# Patient Record
Sex: FEMALE | Race: WHITE | NOT HISPANIC OR LATINO | URBAN - METROPOLITAN AREA
[De-identification: names, ages, dates, MRNs, and addresses within clinical notes are randomized per-mention and may not be internally consistent; named-entity substitution may affect disease eponyms.]

---

## 2017-03-17 ENCOUNTER — OFFICE (OUTPATIENT)
Dept: URBAN - METROPOLITAN AREA CLINIC 101 | Facility: CLINIC | Age: 57
End: 2017-03-17
Payer: COMMERCIAL

## 2017-03-17 VITALS
TEMPERATURE: 97.9 F | DIASTOLIC BLOOD PRESSURE: 92 MMHG | HEART RATE: 81 BPM | SYSTOLIC BLOOD PRESSURE: 134 MMHG | WEIGHT: 179 LBS | HEIGHT: 63 IN

## 2017-03-17 DIAGNOSIS — R49.8 OTHER VOICE AND RESONANCE DISORDERS: ICD-10-CM

## 2017-03-17 DIAGNOSIS — D50.9 IRON DEFICIENCY ANEMIA, UNSPECIFIED: ICD-10-CM

## 2017-03-17 DIAGNOSIS — K21.9 GASTRO-ESOPHAGEAL REFLUX DISEASE WITHOUT ESOPHAGITIS: ICD-10-CM

## 2017-03-17 DIAGNOSIS — R13.19 OTHER DYSPHAGIA: ICD-10-CM

## 2017-03-17 PROCEDURE — 99214 OFFICE O/P EST MOD 30 MIN: CPT

## 2017-05-18 ENCOUNTER — ON CAMPUS - OUTPATIENT (OUTPATIENT)
Dept: URBAN - METROPOLITAN AREA HOSPITAL 35 | Facility: HOSPITAL | Age: 57
End: 2017-05-18

## 2017-05-18 DIAGNOSIS — R13.10 DYSPHAGIA, UNSPECIFIED: ICD-10-CM

## 2017-05-18 PROCEDURE — 43239 EGD BIOPSY SINGLE/MULTIPLE: CPT

## 2017-05-18 PROCEDURE — 43249 ESOPH EGD DILATION <30 MM: CPT

## 2017-08-03 ENCOUNTER — OFFICE (OUTPATIENT)
Dept: URBAN - METROPOLITAN AREA CLINIC 101 | Facility: CLINIC | Age: 57
End: 2017-08-03
Payer: COMMERCIAL

## 2017-08-03 VITALS
HEART RATE: 84 BPM | TEMPERATURE: 97.9 F | SYSTOLIC BLOOD PRESSURE: 131 MMHG | DIASTOLIC BLOOD PRESSURE: 89 MMHG | HEIGHT: 63 IN | WEIGHT: 175 LBS

## 2017-08-03 DIAGNOSIS — M34.89 OTHER SYSTEMIC SCLEROSIS: ICD-10-CM

## 2017-08-03 DIAGNOSIS — D50.9 IRON DEFICIENCY ANEMIA, UNSPECIFIED: ICD-10-CM

## 2017-08-03 DIAGNOSIS — K22.2 ESOPHAGEAL OBSTRUCTION: ICD-10-CM

## 2017-08-03 PROCEDURE — 99214 OFFICE O/P EST MOD 30 MIN: CPT

## 2017-08-03 NOTE — SERVICEHPINOTES
PAULINO WINSLOW   is a   57   year old    female who is being seen in consultation at the request of   Anayeli Florian Dr.   for follow up to EGD. PmHx scleroderma. EGD 5/18/2017-esophageal stricture, s/p dilatation, esophageal biopsy-consistent with inflammation due to acid reflux. After her EGD her aciphex was increased to 20mg BID. No longer reports dysphagia. GERD is well controlled as long as she takes PPI BID and watches what she eats. Denies n/v, abdominal pain. She has a prior history of anemia.  Had a colonoscopy 4/8/2015- diverticulosis. Has history of iron deficiency anemia, will obtain recent labs we only have labs from 1/28/15 hgb-9.9, hct-30.2. 3/19 iron sat-7%. Capsule endoscopy 10/12/15-numerous AVM's in stomach and small intestine some were bleeding. She was referred to Dr. Silvano Padilla 11/15/15 but didn't need to have deep single balloon enteroscopy. She is going to have left knee replacement after Labor Day with Dr. Jessica.BR

## 2017-09-26 ENCOUNTER — INPATIENT HOSPITAL (OUTPATIENT)
Dept: URBAN - METROPOLITAN AREA HOSPITAL 32 | Facility: HOSPITAL | Age: 57
End: 2017-09-26
Payer: COMMERCIAL

## 2017-09-26 DIAGNOSIS — D72.829 ELEVATED WHITE BLOOD CELL COUNT, UNSPECIFIED: ICD-10-CM

## 2017-09-26 DIAGNOSIS — K29.60 OTHER GASTRITIS WITHOUT BLEEDING: ICD-10-CM

## 2017-09-26 DIAGNOSIS — D50.9 IRON DEFICIENCY ANEMIA, UNSPECIFIED: ICD-10-CM

## 2017-09-26 DIAGNOSIS — K20.8 OTHER ESOPHAGITIS: ICD-10-CM

## 2017-09-26 DIAGNOSIS — M34.1 CR(E)ST SYNDROME: ICD-10-CM

## 2017-09-26 DIAGNOSIS — R93.5 ABNORMAL FINDINGS ON DIAGNOSTIC IMAGING OF OTHER ABDOMINAL R: ICD-10-CM

## 2017-09-26 DIAGNOSIS — B37.81 CANDIDAL ESOPHAGITIS: ICD-10-CM

## 2017-09-26 DIAGNOSIS — K22.2 ESOPHAGEAL OBSTRUCTION: ICD-10-CM

## 2017-09-26 PROCEDURE — 99254 IP/OBS CNSLTJ NEW/EST MOD 60: CPT

## 2017-09-27 ENCOUNTER — INPATIENT HOSPITAL (OUTPATIENT)
Dept: URBAN - METROPOLITAN AREA HOSPITAL 32 | Facility: HOSPITAL | Age: 57
End: 2017-09-27
Payer: COMMERCIAL

## 2017-09-27 DIAGNOSIS — B37.81 CANDIDAL ESOPHAGITIS: ICD-10-CM

## 2017-09-27 DIAGNOSIS — M34.1 CR(E)ST SYNDROME: ICD-10-CM

## 2017-09-27 DIAGNOSIS — D50.9 IRON DEFICIENCY ANEMIA, UNSPECIFIED: ICD-10-CM

## 2017-09-27 DIAGNOSIS — R93.5 ABNORMAL FINDINGS ON DIAGNOSTIC IMAGING OF OTHER ABDOMINAL R: ICD-10-CM

## 2017-09-27 DIAGNOSIS — K22.2 ESOPHAGEAL OBSTRUCTION: ICD-10-CM

## 2017-09-27 DIAGNOSIS — K20.8 OTHER ESOPHAGITIS: ICD-10-CM

## 2017-09-27 DIAGNOSIS — K29.60 OTHER GASTRITIS WITHOUT BLEEDING: ICD-10-CM

## 2017-09-27 DIAGNOSIS — D72.829 ELEVATED WHITE BLOOD CELL COUNT, UNSPECIFIED: ICD-10-CM

## 2017-09-27 PROCEDURE — 99232 SBSQ HOSP IP/OBS MODERATE 35: CPT

## 2017-09-28 PROCEDURE — 43239 EGD BIOPSY SINGLE/MULTIPLE: CPT

## 2018-04-19 ENCOUNTER — OFFICE (OUTPATIENT)
Dept: URBAN - METROPOLITAN AREA CLINIC 101 | Facility: CLINIC | Age: 58
End: 2018-04-19

## 2018-04-19 VITALS
SYSTOLIC BLOOD PRESSURE: 140 MMHG | WEIGHT: 190 LBS | TEMPERATURE: 97.7 F | DIASTOLIC BLOOD PRESSURE: 82 MMHG | HEART RATE: 88 BPM | HEIGHT: 63 IN

## 2018-04-19 DIAGNOSIS — R10.11 RIGHT UPPER QUADRANT PAIN: ICD-10-CM

## 2018-04-19 DIAGNOSIS — M34.89 OTHER SYSTEMIC SCLEROSIS: ICD-10-CM

## 2018-04-19 DIAGNOSIS — J69.0 PNEUMONITIS DUE TO INHALATION OF FOOD AND VOMIT: ICD-10-CM

## 2018-04-19 DIAGNOSIS — R13.10 DYSPHAGIA, UNSPECIFIED: ICD-10-CM

## 2018-04-19 PROCEDURE — 99215 OFFICE O/P EST HI 40 MIN: CPT

## 2018-04-19 NOTE — SERVICEHPINOTES
PAULINO WINSLOW   is a   58  female who complains of dysphagia. She has a hx of scleroderma and CREST syndrome. EGD 5/18/2017-esophageal stricture, s/p dilatation, esophageal biopsy-consistent with inflammation due to acid reflux. She has been on Aciphex 20mg BID since and was asymptomatic until having knee replacement surgery in Sept 2017. She states she got aspiration pneumonia while in hospital (states she has had aspiration pneumonia 5x in past year). She also had a transfusion while in the hospital. Had c diff as well during this hospital stay, states it was the 2nd time she has ever had it. She had an EGD inpt with Dr Richardson on 9/28/17, revealed mild chronic gastritis and candida, treated with diflucan.BRShe states she felt okay up until 2 months ago when dysphagia increased and is having issues with all solids. She has been regurgitating "everything" solid she has been eating.  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 She is s/p cholecystectomy in 2004. No nausea, weight loss, fever, or chills. UTD on colonoscopy, last one 4/2015, no polyps, recall 10 years.BR

## 2018-04-26 ENCOUNTER — ON CAMPUS - OUTPATIENT (OUTPATIENT)
Dept: URBAN - METROPOLITAN AREA HOSPITAL 35 | Facility: HOSPITAL | Age: 58
End: 2018-04-26

## 2018-04-26 DIAGNOSIS — R13.10 DYSPHAGIA, UNSPECIFIED: ICD-10-CM

## 2018-04-26 DIAGNOSIS — K21.9 GASTRO-ESOPHAGEAL REFLUX DISEASE WITHOUT ESOPHAGITIS: ICD-10-CM

## 2018-04-26 PROCEDURE — 43450 DILATE ESOPHAGUS 1/MULT PASS: CPT

## 2018-04-26 PROCEDURE — 43235 EGD DIAGNOSTIC BRUSH WASH: CPT

## 2018-06-21 ENCOUNTER — OFFICE (OUTPATIENT)
Dept: URBAN - METROPOLITAN AREA CLINIC 101 | Facility: CLINIC | Age: 58
End: 2018-06-21
Payer: COMMERCIAL

## 2018-06-21 VITALS
WEIGHT: 191 LBS | DIASTOLIC BLOOD PRESSURE: 89 MMHG | TEMPERATURE: 97.9 F | SYSTOLIC BLOOD PRESSURE: 138 MMHG | HEIGHT: 62 IN | HEART RATE: 91 BPM

## 2018-06-21 DIAGNOSIS — K21.9 GASTRO-ESOPHAGEAL REFLUX DISEASE WITHOUT ESOPHAGITIS: ICD-10-CM

## 2018-06-21 DIAGNOSIS — R68.2 DRY MOUTH, UNSPECIFIED: ICD-10-CM

## 2018-06-21 DIAGNOSIS — M35.00 SJOGREN SYNDROME, UNSPECIFIED: ICD-10-CM

## 2018-06-21 DIAGNOSIS — M34.89 OTHER SYSTEMIC SCLEROSIS: ICD-10-CM

## 2018-06-21 PROCEDURE — 99214 OFFICE O/P EST MOD 30 MIN: CPT

## 2018-06-21 RX ORDER — CEVIMELINE HYDROCHLORIDE 30 MG/1
CAPSULE ORAL
Qty: 90 | Refills: 1 | Status: COMPLETED
End: 2020-07-01

## 2018-06-21 RX ORDER — RABEPRAZOLE SODIUM 20 MG/1
TABLET, DELAYED RELEASE ORAL
Qty: 180 | Refills: 3 | Status: ACTIVE

## 2018-06-21 NOTE — SERVICEHPINOTES
PAULINO WINSLOW   is a   58  female who presents for EGD f/u. Pt with hx of scleroderma and CREST syndrome. She had EGD on 4/26/19 for dysphagia. EGD revealed mild nonerosive gastritis, small hiatal hernia, benign gastric polyps. She was dilated 56-Fr. Since EGD, pt states she feels much better and is no longer having dysphagia. She is still careful with what she eats due to scleroderma, but feels much better.BRShe continues to take aciphex 20mg BID (qAM and qhs) which works well for her and she has not been having much breakthrough symptoms. However, she still occasionally has breakthrough sx that wake her up at night, relieved by taking Tums and drinking water. She always waits at least 3 hours after eating before lying down and does not snack at night. She is also overdue on seeing rheumatology and states it has been over 1 year since she has seen them. She had an apt scheduled a month or so ago but had to miss it due to the unexpected passing of her mother-in-law. She is concerned about her teeth and is getting most of her crowns replaced due to cavities, most likely caused by her accompanying Sjogren's syndrome. She took pilocarpine in the past before bed for xerostomia which worked well for her but she is now completely out.BR

## 2018-11-29 ENCOUNTER — OFFICE (OUTPATIENT)
Dept: URBAN - METROPOLITAN AREA CLINIC 78 | Facility: CLINIC | Age: 58
End: 2018-11-29
Payer: COMMERCIAL

## 2018-11-29 VITALS
DIASTOLIC BLOOD PRESSURE: 89 MMHG | HEIGHT: 63 IN | TEMPERATURE: 97 F | HEART RATE: 76 BPM | SYSTOLIC BLOOD PRESSURE: 134 MMHG | WEIGHT: 190 LBS

## 2018-11-29 DIAGNOSIS — M34.89 OTHER SYSTEMIC SCLEROSIS: ICD-10-CM

## 2018-11-29 DIAGNOSIS — R13.10 DYSPHAGIA, UNSPECIFIED: ICD-10-CM

## 2018-11-29 DIAGNOSIS — M35.00 SJOGREN SYNDROME, UNSPECIFIED: ICD-10-CM

## 2018-11-29 PROCEDURE — 99214 OFFICE O/P EST MOD 30 MIN: CPT

## 2018-11-29 NOTE — SERVICEHPINOTES
PAULINO WINSLOW   is a   58  female who complains of dysphagia. Pt with hx of scleroderma and CREST syndrome. She has a h/o esophageal ring, last dilated during EGD on 4/26/18--dilated 56-Fr. Dysphagia resolved after dilation but returned a few weeks ago. Is having dysphagia to solids and pain at times with liquids. She has reduced her aciphex to only once daily (qAM) and is avoiding any acidic foods. Not having any breakthrough GERD and no nocturnal symptoms. She saw her rheumatologist in Sept and is having PFTs, etc soon. Is having a lot of dental issues due to sjogren's. She also recently started gabapentin for autoimmune neuropathy.

## 2018-12-12 ENCOUNTER — ON CAMPUS - OUTPATIENT (OUTPATIENT)
Dept: URBAN - METROPOLITAN AREA HOSPITAL 35 | Facility: HOSPITAL | Age: 58
End: 2018-12-12
Payer: COMMERCIAL

## 2018-12-12 DIAGNOSIS — K22.10 ULCER OF ESOPHAGUS WITHOUT BLEEDING: ICD-10-CM

## 2018-12-12 DIAGNOSIS — M34.89 OTHER SYSTEMIC SCLEROSIS: ICD-10-CM

## 2018-12-12 DIAGNOSIS — M35.00 SJOGREN SYNDROME, UNSPECIFIED: ICD-10-CM

## 2018-12-12 DIAGNOSIS — R13.10 DYSPHAGIA, UNSPECIFIED: ICD-10-CM

## 2018-12-12 PROCEDURE — 43249 ESOPH EGD DILATION <30 MM: CPT

## 2019-02-21 ENCOUNTER — ON CAMPUS - OUTPATIENT (OUTPATIENT)
Dept: URBAN - METROPOLITAN AREA HOSPITAL 35 | Facility: HOSPITAL | Age: 59
End: 2019-02-21

## 2019-02-21 DIAGNOSIS — R13.10 DYSPHAGIA, UNSPECIFIED: ICD-10-CM

## 2019-02-21 DIAGNOSIS — M34.89 OTHER SYSTEMIC SCLEROSIS: ICD-10-CM

## 2019-02-21 DIAGNOSIS — K22.10 ULCER OF ESOPHAGUS WITHOUT BLEEDING: ICD-10-CM

## 2019-02-21 PROCEDURE — 43249 ESOPH EGD DILATION <30 MM: CPT

## 2020-07-01 ENCOUNTER — TELEHEALTH PROVIDED OTHER THAN IN PATIENT'S HOME (OUTPATIENT)
Dept: URBAN - METROPOLITAN AREA TELEHEALTH 12 | Facility: TELEHEALTH | Age: 60
End: 2020-07-01
Payer: COMMERCIAL

## 2020-07-01 VITALS — HEIGHT: 62 IN | WEIGHT: 167 LBS

## 2020-07-01 DIAGNOSIS — K21.9 GASTRO-ESOPHAGEAL REFLUX DISEASE WITHOUT ESOPHAGITIS: ICD-10-CM

## 2020-07-01 DIAGNOSIS — M35.00 SJOGREN SYNDROME, UNSPECIFIED: ICD-10-CM

## 2020-07-01 DIAGNOSIS — M34.89 OTHER SYSTEMIC SCLEROSIS: ICD-10-CM

## 2020-07-01 DIAGNOSIS — R13.10 DYSPHAGIA, UNSPECIFIED: ICD-10-CM

## 2020-07-01 DIAGNOSIS — R15.9 FULL INCONTINENCE OF FECES: ICD-10-CM

## 2020-07-01 PROCEDURE — 99214 OFFICE O/P EST MOD 30 MIN: CPT | Mod: 95

## 2020-07-01 RX ORDER — RABEPRAZOLE SODIUM 20 MG/1
TABLET, DELAYED RELEASE ORAL
Qty: 180 | Refills: 3 | Status: ACTIVE

## 2020-07-01 NOTE — SERVICEHPINOTES
PATIENT VERIFIED BY DATE OF BIRTH AND NAME. Patient has been consented for this telecommunication visit.BRPt with hx of scleroderma and CREST syndrome. She has a h/o esophageal ring. She had EGD 12/2018 which showed ulcers on esophageal stricture which was dilated with balloon to 18mm repeat EGD 2/2019 with healed ulcers balloon dilation performed to 20mm. She has not had any recurrence of dysphagia since last EGD. She has continued on aciphex daily--most of the time takes it once daily but sometimes needs a second dose depending on what she eats. She does watch her diet and really tries to avoid spicy food, sweets, tomato-based foods, etc. She rarely (once per month) has  acid regurgitation at night if she eats later, symptoms resolve with water/baking soda. She denies abd pain. She continues to suffer with dental issues/caries due to sjogren's and is thinking about getting dental implant. Scott main complaint today is fecal incontinence/anal leakage. BMs overall pretty regular, once daily or every other day. She lost 50lbs last year (intentionally) but gained 12lbs back due to COVID--is doing keto diet currently. She does eat a lot of vegetables. She reports frequent mucus when wiping no blood in stool or when wiping. Stool consistency varies but usually pretty normal/soft. Often has to wear pads. She has had 4 children. Last colonoscopy was in 2015 and was normal.BRROS as per HPI, otherwise negative.

## 2021-03-12 ENCOUNTER — OFFICE (OUTPATIENT)
Dept: URBAN - METROPOLITAN AREA CLINIC 34 | Facility: CLINIC | Age: 61
End: 2021-03-12

## 2021-03-12 VITALS
HEART RATE: 81 BPM | SYSTOLIC BLOOD PRESSURE: 131 MMHG | DIASTOLIC BLOOD PRESSURE: 80 MMHG | TEMPERATURE: 98.1 F | HEIGHT: 62 IN

## 2021-03-12 DIAGNOSIS — R19.7 DIARRHEA, UNSPECIFIED: ICD-10-CM

## 2021-03-12 DIAGNOSIS — M34.89 OTHER SYSTEMIC SCLEROSIS: ICD-10-CM

## 2021-03-12 PROCEDURE — 99214 OFFICE O/P EST MOD 30 MIN: CPT

## 2021-03-12 NOTE — SERVICEHPINOTES
PAULINO WINSLOW   is a   60  female who complains of diarrhea.BRPt with hx of scleroderma and CREST syndrome with secondary Sjogren's.BRShe states she had 2 surgeries in December --bunionectomy on Dec 2 and on Dec 14th teeth pulled, grafts ,etc. Was on prophylatic amoxicillin 1 week prior to dental surgery. Approx less than 1 week later started getting a fever of 102--was put on z-pack. Started taking florastor. Soon after she started having explosive diarrhea. She has had c diff twice in the past and knew it was similar. She ended up seeing PCP on Jan 1 and was treated empirically for c diff--14 days of vancomycin. Symptoms did not seem to improve and was then put on another round of vanco. Again, symptoms did not improve, ended up having stool tests but c diff not done due to solid stool. BMs have continued to be 4-5x/day, BSS type 6, 7 mainly. She has issues with fecal incontinence and has had this for awhile, worsened with the loose stool. MEGHAN ordered stool studies for her on 2/23--negative GI profile, negative c diff DNA, fecal calpro 50, normal fecal elastase. She tried Imodium 4 pills which did not help. I rx'd Lomotil which she took 1 pill of a couple days ago and worked really well, maybe caused some constipation. She took another Lomotil this AM. Some rectal irritation and mucus leakage but no blood in stool. Her last colonoscopy was in 2015 without polyps, recall 10 years. She has a h/o esophageal stricture requiring dilations, has not had any issues with dysphagia since last dilation 2/2019. She is still on soft foods since dental surgery.BRConcerning scleroderma, she has been stable. Recent PFTs were normal. She lost 73lbs over the past couple years intentionally, gained 10 back during covid. Feels better than she has for some time bere with the weight loss. No further complaints today.

## 2022-01-18 ENCOUNTER — TELEHEALTH PROVIDED OTHER THAN IN PATIENT'S HOME (OUTPATIENT)
Dept: URBAN - METROPOLITAN AREA TELEHEALTH 3 | Facility: TELEHEALTH | Age: 62
End: 2022-01-18
Payer: COMMERCIAL

## 2022-01-18 VITALS — WEIGHT: 123 LBS | HEIGHT: 62 IN

## 2022-01-18 DIAGNOSIS — K22.2 ESOPHAGEAL OBSTRUCTION: ICD-10-CM

## 2022-01-18 DIAGNOSIS — U07.1 COVID-19: ICD-10-CM

## 2022-01-18 DIAGNOSIS — R10.11 RIGHT UPPER QUADRANT PAIN: ICD-10-CM

## 2022-01-18 DIAGNOSIS — M34.89 OTHER SYSTEMIC SCLEROSIS: ICD-10-CM

## 2022-01-18 PROCEDURE — 99215 OFFICE O/P EST HI 40 MIN: CPT | Mod: 95 | Performed by: INTERNAL MEDICINE

## 2022-01-18 NOTE — SERVICEHPINOTES
PATIENT VERIFIED BY DATE OF BIRTH AND NAME. Patient has been consented for this telecommunication visit.   62 yo WF with hx of scleroderma and esophageal stricture last underwent EGD in 2019 and had esophageal stricture dilated. She states she has been diagnosed with Covid for the 3rd time. She notes in 12/21 developing acute ruq abdominal pain, presented to ER and underwent CT that showed dilated bile ducts. She notes continuing abdominal pain worse after eating and also lost weight intentionally. She states GERD is under control. She does states she has been drinking some  etoh.

## 2022-10-28 ENCOUNTER — OFFICE (OUTPATIENT)
Dept: URBAN - METROPOLITAN AREA CLINIC 102 | Facility: CLINIC | Age: 62
End: 2022-10-28
Payer: COMMERCIAL

## 2022-10-28 VITALS
HEART RATE: 92 BPM | HEIGHT: 62 IN | TEMPERATURE: 97.9 F | DIASTOLIC BLOOD PRESSURE: 63 MMHG | SYSTOLIC BLOOD PRESSURE: 106 MMHG | WEIGHT: 120 LBS

## 2022-10-28 DIAGNOSIS — J84.10 PULMONARY FIBROSIS, UNSPECIFIED: ICD-10-CM

## 2022-10-28 DIAGNOSIS — Z87.19 PERSONAL HISTORY OF OTHER DISEASES OF THE DIGESTIVE SYSTEM: ICD-10-CM

## 2022-10-28 DIAGNOSIS — M34.89 OTHER SYSTEMIC SCLEROSIS: ICD-10-CM

## 2022-10-28 DIAGNOSIS — R13.10 DYSPHAGIA, UNSPECIFIED: ICD-10-CM

## 2022-10-28 PROCEDURE — 99214 OFFICE O/P EST MOD 30 MIN: CPT | Performed by: PHYSICIAN ASSISTANT

## 2022-10-28 RX ORDER — RABEPRAZOLE SODIUM 20 MG/1
TABLET, DELAYED RELEASE ORAL
Qty: 180 | Refills: 3 | Status: ACTIVE

## 2022-10-28 RX ORDER — DIPHENOXYLATE HYDROCHLORIDE AND ATROPINE SULFATE 2.5; .025 MG/1; MG/1
TABLET ORAL
Qty: 60 | Refills: 5 | Status: ACTIVE
Start: 2021-03-08 | End: 2023-06-22

## 2022-10-28 NOTE — SERVICENOTES
Awaiting imaging report from [place] - If suggestive of acute diverticulitis in August, suggest scheduling for colonoscopy at the time of EGD.

Education given on increasing fiber intake and fiber supplements for purposes of stool bulking in attempt to limit use of Lomotil - she agrees to try this and monitor symptoms.

## 2022-10-28 NOTE — SERVICEHPINOTES
Ms. Rasmussen is a 61 YoF with hx of scleroderma/CREST and esophageal stricture (EGD in 2019, +dilation), diverticulitis, and pulmonary fibrosis. She presents to the office today with primary concern of dysphagia. This is a recurrent issue and seems to be similar to past symptoms prior to EGD/dilation in 2019. She experiences solid foods and pills getting stuck in her throat, often needing to regurgitate to resolve the blockage. No issue with liquids. She feels her GERD is under good control with rabeprazole.  She has an ongoing secondary concern of stool incontinence for which she has been taking PRN lomotil and was evaluated by a urogynecologist. She recounts that this specialist did muscle testing and did not feel there was anything surgically correctible. She feels her symptoms are relatively well controlled with diet and the Lomotil.pablo shah She reports having one episode of acute GI illness with abdominal pain in August of 2022 and had a CT done in [place] (Report not available). She says she was told it was not diverticulitis but was treated with a course of Augmentin with complete symptom resolution. She says her last episode of diverticulitis was many years ago. Past records noted for an episode of acute RUQ abdominal pain (Dec 2021), presented to ER and underwent CT that showed dilated bile ducts, but had negative MRCP (Feb 2022) and symptoms resolved. No current abdominal pain, change in bowel habits or rectal bleeding.pablo shah Needs med refills for rabeprazole and Lomotil.

## 2022-12-12 ENCOUNTER — ON CAMPUS - OUTPATIENT (OUTPATIENT)
Dept: URBAN - METROPOLITAN AREA HOSPITAL 16 | Facility: HOSPITAL | Age: 62
End: 2022-12-12
Payer: COMMERCIAL

## 2022-12-12 DIAGNOSIS — K22.2 ESOPHAGEAL OBSTRUCTION: ICD-10-CM

## 2022-12-12 DIAGNOSIS — R13.10 DYSPHAGIA, UNSPECIFIED: ICD-10-CM

## 2022-12-12 DIAGNOSIS — K31.7 POLYP OF STOMACH AND DUODENUM: ICD-10-CM

## 2022-12-12 PROCEDURE — 43249 ESOPH EGD DILATION <30 MM: CPT | Performed by: INTERNAL MEDICINE

## 2022-12-12 PROCEDURE — 43235 EGD DIAGNOSTIC BRUSH WASH: CPT | Performed by: INTERNAL MEDICINE

## 2023-06-22 ENCOUNTER — OFFICE (OUTPATIENT)
Dept: URBAN - METROPOLITAN AREA CLINIC 102 | Facility: CLINIC | Age: 63
End: 2023-06-22
Payer: MEDICARE

## 2023-06-22 VITALS
TEMPERATURE: 98.3 F | DIASTOLIC BLOOD PRESSURE: 88 MMHG | HEART RATE: 63 BPM | HEIGHT: 62 IN | WEIGHT: 124 LBS | SYSTOLIC BLOOD PRESSURE: 161 MMHG

## 2023-06-22 DIAGNOSIS — R74.8 ABNORMAL LEVELS OF OTHER SERUM ENZYMES: ICD-10-CM

## 2023-06-22 DIAGNOSIS — K59.1 FUNCTIONAL DIARRHEA: ICD-10-CM

## 2023-06-22 DIAGNOSIS — R13.10 DYSPHAGIA, UNSPECIFIED: ICD-10-CM

## 2023-06-22 DIAGNOSIS — K85.00 IDIOPATHIC ACUTE PANCREATITIS WITHOUT NECROSIS OR INFECTION: ICD-10-CM

## 2023-06-22 DIAGNOSIS — N18.9 CHRONIC KIDNEY DISEASE, UNSPECIFIED: ICD-10-CM

## 2023-06-22 DIAGNOSIS — M34.89 OTHER SYSTEMIC SCLEROSIS: ICD-10-CM

## 2023-06-22 LAB
AMBIG ABBREV CMP14 DEFAULT: (no result)
CBC/DIFF AMBIGUOUS DEFAULT: BASO (ABSOLUTE): 0 X10E3/UL (ref 0–0.2)
CBC/DIFF AMBIGUOUS DEFAULT: BASOS: 1 %
CBC/DIFF AMBIGUOUS DEFAULT: EOS (ABSOLUTE): 0.3 X10E3/UL (ref 0–0.4)
CBC/DIFF AMBIGUOUS DEFAULT: EOS: 5 %
CBC/DIFF AMBIGUOUS DEFAULT: HEMATOCRIT: 36.9 % (ref 34–46.6)
CBC/DIFF AMBIGUOUS DEFAULT: HEMATOLOGY COMMENTS: (no result)
CBC/DIFF AMBIGUOUS DEFAULT: HEMOGLOBIN: 11.8 G/DL (ref 11.1–15.9)
CBC/DIFF AMBIGUOUS DEFAULT: IMMATURE CELLS: (no result)
CBC/DIFF AMBIGUOUS DEFAULT: IMMATURE GRANS (ABS): 0 X10E3/UL (ref 0–0.1)
CBC/DIFF AMBIGUOUS DEFAULT: IMMATURE GRANULOCYTES: 1 %
CBC/DIFF AMBIGUOUS DEFAULT: LYMPHS (ABSOLUTE): 2.2 X10E3/UL (ref 0.7–3.1)
CBC/DIFF AMBIGUOUS DEFAULT: LYMPHS: 37 %
CBC/DIFF AMBIGUOUS DEFAULT: MCH: 28.2 PG (ref 26.6–33)
CBC/DIFF AMBIGUOUS DEFAULT: MCHC: 32 G/DL (ref 31.5–35.7)
CBC/DIFF AMBIGUOUS DEFAULT: MCV: 88 FL (ref 79–97)
CBC/DIFF AMBIGUOUS DEFAULT: MONOCYTES(ABSOLUTE): 0.6 X10E3/UL (ref 0.1–0.9)
CBC/DIFF AMBIGUOUS DEFAULT: MONOCYTES: 9 %
CBC/DIFF AMBIGUOUS DEFAULT: NEUTROPHILS (ABSOLUTE): 2.9 X10E3/UL (ref 1.4–7)
CBC/DIFF AMBIGUOUS DEFAULT: NEUTROPHILS: 47 %
CBC/DIFF AMBIGUOUS DEFAULT: NRBC: (no result)
CBC/DIFF AMBIGUOUS DEFAULT: PLATELETS: 271 X10E3/UL (ref 150–450)
CBC/DIFF AMBIGUOUS DEFAULT: RBC: 4.18 X10E6/UL (ref 3.77–5.28)
CBC/DIFF AMBIGUOUS DEFAULT: RDW: 12.4 % (ref 11.7–15.4)
CBC/DIFF AMBIGUOUS DEFAULT: WBC: 6 X10E3/UL (ref 3.4–10.8)
COMP. METABOLIC PANEL (14): A/G RATIO: 2.2 (ref 1.2–2.2)
COMP. METABOLIC PANEL (14): ALBUMIN: 4.7 G/DL (ref 3.8–4.8)
COMP. METABOLIC PANEL (14): ALKALINE PHOSPHATASE: 94 IU/L (ref 44–121)
COMP. METABOLIC PANEL (14): ALT (SGPT): 16 IU/L (ref 0–32)
COMP. METABOLIC PANEL (14): AST (SGOT): 34 IU/L (ref 0–40)
COMP. METABOLIC PANEL (14): BILIRUBIN, TOTAL: 0.4 MG/DL (ref 0–1.2)
COMP. METABOLIC PANEL (14): BUN/CREATININE RATIO: 11 — LOW (ref 12–28)
COMP. METABOLIC PANEL (14): BUN: 11 MG/DL (ref 8–27)
COMP. METABOLIC PANEL (14): CALCIUM: 9.8 MG/DL (ref 8.7–10.3)
COMP. METABOLIC PANEL (14): CARBON DIOXIDE, TOTAL: 21 MMOL/L (ref 20–29)
COMP. METABOLIC PANEL (14): CHLORIDE: 98 MMOL/L (ref 96–106)
COMP. METABOLIC PANEL (14): CREATININE: 0.98 MG/DL (ref 0.57–1)
COMP. METABOLIC PANEL (14): EGFR: 64 ML/MIN/1.73 (ref 59–?)
COMP. METABOLIC PANEL (14): GLOBULIN, TOTAL: 2.1 G/DL (ref 1.5–4.5)
COMP. METABOLIC PANEL (14): GLUCOSE: 88 MG/DL (ref 70–99)
COMP. METABOLIC PANEL (14): POTASSIUM: 4.7 MMOL/L (ref 3.5–5.2)
COMP. METABOLIC PANEL (14): PROTEIN, TOTAL: 6.8 G/DL (ref 6–8.5)
COMP. METABOLIC PANEL (14): SODIUM: 136 MMOL/L (ref 134–144)

## 2023-06-22 PROCEDURE — 99215 OFFICE O/P EST HI 40 MIN: CPT | Performed by: PHYSICIAN ASSISTANT

## 2023-06-22 RX ORDER — DIPHENOXYLATE HYDROCHLORIDE AND ATROPINE SULFATE 2.5; .025 MG/1; MG/1
TABLET ORAL
Qty: 60 | Refills: 5 | Status: ACTIVE
Start: 2023-06-22

## 2023-06-22 NOTE — INTERFACERESULTNOTES
patient has been advised on results. She is aware and states she will be stopping by office in [place] to  samples. Another staff member already provided results

## 2023-06-22 NOTE — SERVICEHPINOTES
Ms. Rasmussen is a 63 YoF who presents to the office for ER follow-up and evaluation of possible pancreatitis. She continues having ongoing intermittent RUQ to epigastric discomfort. She was seen at the ER in May/2023 (the day after visiting a winery with family) with RUQ pain, vomiting, diarrhea - Was told her lipase was >800 (no records). She was discharged with PRN pain medications and instructions to rest and hydrate - no further EtOH.She has had multiple ER/urgent care visits in the last 2 months with covid-19, suspected aspiration after foot surgery, UTI and GI symptoms. She has history of scleroderma/CREST. Underwent EGD in Dec/2022 for dysphagia, with dilation of recurrent esophageal web. No significant gastric pathology (multiple fundic gland polyps noted).br Imaging results reviewed:br- Abd U/S in May noted to be top/normal 2mm. 
br- Previous MRI/MRCP in Feb/2022 with common duct prominence, no stone/stricture, or mass. 
br- She states having had a CT of her abdomen in Sept/2022 at [facility] (no records yet available).
br
brShe reports having prior heavy EtOH use (mostly during Covid-19 pandemic) after losing her job. She has not consumed any EtOH since her May ER visit. Has not yet had follow-up labs. Bowel habits are back to mostly regular with occasional loose stools. She does chronically suffer from some fecal incontinence she attributes to connective tissue disorder. She requests refill of Lomotil as this has been effective for her.

## 2023-06-23 LAB — PANCREATIC ELASTASE, FECAL: 152 UG ELAST./G — LOW (ref 200–?)

## 2023-07-13 LAB — LIPASE: 33 U/L (ref 14–72)

## 2023-10-16 ENCOUNTER — TELEHEALTH PROVIDED OTHER THAN IN PATIENT'S HOME (OUTPATIENT)
Dept: URBAN - METROPOLITAN AREA TELEHEALTH 3 | Facility: TELEHEALTH | Age: 63
End: 2023-10-16
Payer: MEDICARE

## 2023-10-16 VITALS — HEIGHT: 62 IN | WEIGHT: 130 LBS

## 2023-10-16 DIAGNOSIS — M34.89 OTHER SYSTEMIC SCLEROSIS: ICD-10-CM

## 2023-10-16 DIAGNOSIS — K59.1 FUNCTIONAL DIARRHEA: ICD-10-CM

## 2023-10-16 DIAGNOSIS — R10.11 RIGHT UPPER QUADRANT PAIN: ICD-10-CM

## 2023-10-16 DIAGNOSIS — K21.9 GASTRO-ESOPHAGEAL REFLUX DISEASE WITHOUT ESOPHAGITIS: ICD-10-CM

## 2023-10-16 PROCEDURE — 99214 OFFICE O/P EST MOD 30 MIN: CPT | Mod: 95 | Performed by: INTERNAL MEDICINE

## 2023-10-16 RX ORDER — RABEPRAZOLE SODIUM 20 MG/1
TABLET, DELAYED RELEASE ORAL
Qty: 180 | Refills: 3 | Status: ACTIVE

## 2023-10-16 NOTE — SERVICEHPINOTES
PATIENT VERIFIED BY DATE OF BIRTH AND NAME. Patient has been consented for this telecommunication visit.   PAULINO WINSLOW   is a   63  female who presents for follow up. She saw Julianne PRADO in June 2023. She also had EUS with Dr. Chan to rule out causes of pancreatitis. She was noted to have possible sludge in bile ducts. She was recommended to get an ERCP and is scheduled for November.
br
br She notes that she is still having bouts of RUQ pain and notes that she had an ERCP years ago after her CCY for similar RUQ pain. She notes that prior ERCP did relieve pain. 
br
br She also notes issues with BMs, mostly incontinence/urgency which she chalks up to scleroderma and inability to currently take her lomotil due to narcotic use after foot surgery.  Fecal elastase test was positive and she was given samples of creon. She did not see much improvement with creon. Elastase was checked shortly after bout of pancreatitis. br
br ROS as above, otherwise remainder of ROS is negative.

## 2023-10-16 NOTE — SERVICENOTES
Patient's visit was conducted through [employer] video telecommunication. Patient consented before the start of visit as to understanding of privacy concerns, possible technological failure, and their responsibility of carrying out instructions of plan.

Patient understands and agrees with plan. Questions/concerns addressed

## 2025-02-27 ENCOUNTER — OFFICE (OUTPATIENT)
Dept: URBAN - METROPOLITAN AREA CLINIC 102 | Facility: CLINIC | Age: 65
End: 2025-02-27
Payer: COMMERCIAL

## 2025-02-27 VITALS
SYSTOLIC BLOOD PRESSURE: 133 MMHG | DIASTOLIC BLOOD PRESSURE: 74 MMHG | HEART RATE: 61 BPM | WEIGHT: 134 LBS | HEIGHT: 62 IN | TEMPERATURE: 97.7 F

## 2025-02-27 DIAGNOSIS — K59.1 FUNCTIONAL DIARRHEA: ICD-10-CM

## 2025-02-27 DIAGNOSIS — R10.11 RIGHT UPPER QUADRANT PAIN: ICD-10-CM

## 2025-02-27 DIAGNOSIS — M34.1 CR(E)ST SYNDROME: ICD-10-CM

## 2025-02-27 DIAGNOSIS — R15.9 FULL INCONTINENCE OF FECES: ICD-10-CM

## 2025-02-27 PROCEDURE — 99215 OFFICE O/P EST HI 40 MIN: CPT | Performed by: INTERNAL MEDICINE

## 2025-02-27 RX ORDER — DIPHENOXYLATE HYDROCHLORIDE AND ATROPINE SULFATE 2.5; .025 MG/1; MG/1
15 TABLET ORAL
Qty: 180 | Refills: 3 | Status: ACTIVE
Start: 2025-02-27

## 2025-02-27 NOTE — SERVICEHPINOTES
PAULINO WINSLOW   is a   64  female who complains of continued episodes of incontinence. She was seen in October and after that had colonoscopy which revealed microscopic colitis. She was given budesonide taper and did better while on it. She has noted recurrence of symptoms since stopping budesonide. She was also given rx colestid prior to colonoscopy and notes that it can help a little bit but does not stop the episodes of incontinence (she was taking one pill twice a day). Since stopping budesonide she notes she may have a couple weeks where things are quiet. She can then have a couple of weeks of softer stools and incontinence. During those weeks, she may have 3-4 episodes of incontinence in those two weeks. During those episodes she may note more abdominal cramping. She notes nausea at times but no vomiting. She denies any melena, rectal bleeding. She denies any weight loss. Her appetite is decreased mainly because she does not want to eat because of potential for diarrhea/incontinence.  She is on PPI for GERD. No dysphagia. She does take a  number of supplements but plans to hold them for now to see if BMs change.

## 2025-06-24 ENCOUNTER — OFFICE (OUTPATIENT)
Dept: URBAN - METROPOLITAN AREA CLINIC 34 | Facility: CLINIC | Age: 65
End: 2025-06-24
Payer: COMMERCIAL

## 2025-06-24 VITALS
WEIGHT: 127 LBS | HEIGHT: 62 IN | HEART RATE: 71 BPM | SYSTOLIC BLOOD PRESSURE: 110 MMHG | DIASTOLIC BLOOD PRESSURE: 61 MMHG | TEMPERATURE: 97.4 F

## 2025-06-24 DIAGNOSIS — K59.1 FUNCTIONAL DIARRHEA: ICD-10-CM

## 2025-06-24 DIAGNOSIS — M34.1 CR(E)ST SYNDROME: ICD-10-CM

## 2025-06-24 DIAGNOSIS — R15.9 FULL INCONTINENCE OF FECES: ICD-10-CM

## 2025-06-24 PROCEDURE — 99214 OFFICE O/P EST MOD 30 MIN: CPT | Performed by: INTERNAL MEDICINE

## 2025-06-24 NOTE — SERVICEHPINOTES
PAULINO WINSLOW   is a   65  female who presents for follow up. She has continued to note issues with incontinence. She restarted budesonide after last visit and felt like BMs were somewhat improved. with some improvement in stool formation and frequency. However, she would still have episodes of incontinence/accidents. She can have passage of mucus or bright red blood at that time. She has taken course of lomotil for several days in a row when symptoms are worse. She has not tried using the lomotil daily. We had planned for dynamic pelvic MRI but she has not completed that as of yet. br
br She notes occasional nausea but no vomiting. No melena. She can note reflux and intermittent dysphagia. She will sometimes have to drink diet soda with a meal to make sure food goes down. She does not feel like it is the schatzki ring causing an issue. Her last EGD was in 2022 and she had dilation to 20 mm at that time.